# Patient Record
Sex: MALE | Race: WHITE | NOT HISPANIC OR LATINO | Employment: FULL TIME | URBAN - METROPOLITAN AREA
[De-identification: names, ages, dates, MRNs, and addresses within clinical notes are randomized per-mention and may not be internally consistent; named-entity substitution may affect disease eponyms.]

---

## 2020-02-25 ENCOUNTER — HOSPITAL ENCOUNTER (EMERGENCY)
Facility: HOSPITAL | Age: 35
Discharge: HOME | End: 2020-02-25
Attending: FAMILY MEDICINE

## 2020-02-25 VITALS
DIASTOLIC BLOOD PRESSURE: 84 MMHG | BODY MASS INDEX: 27.25 KG/M2 | OXYGEN SATURATION: 99 % | WEIGHT: 184 LBS | SYSTOLIC BLOOD PRESSURE: 133 MMHG | TEMPERATURE: 98 F | HEIGHT: 69 IN | RESPIRATION RATE: 16 BRPM | HEART RATE: 99 BPM

## 2020-02-25 DIAGNOSIS — S97.82XA CRUSH INJURY OF LEFT FOOT, INITIAL ENCOUNTER: ICD-10-CM

## 2020-02-25 PROCEDURE — 99283 EMERGENCY DEPT VISIT LOW MDM: CPT | Mod: 25

## 2020-02-25 RX ORDER — NAPROXEN 500 MG/1
500 TABLET ORAL 2 TIMES DAILY WITH MEALS
Qty: 12 TABLET | Refills: 0 | Status: SHIPPED | OUTPATIENT
Start: 2020-02-25

## 2020-02-25 NOTE — ED NOTES
Patient identifiers verified and correct for Jonah Denise Dar.    LOC: The patient is awake, alert and aware of environment with an appropriate affect, the patient is oriented x 3 and speaking appropriately.  APPEARANCE: Patient resting comfortably and in no acute distress, patient is clean and well groomed, patient's clothing is properly fastened.  SKIN: The skin is warm and dry, color consistent with ethnicity, patient has normal skin turgor and moist mucus membranes, skin intact, no breakdown or bruising noted.  MUSCULOSKELETAL: Patient moving all extremities spontaneously. EX  L foot pain. No swelling or deformity noted.   RESPIRATORY: Airway is open and patent, respirations are spontaneous.  CARDIAC: Patient has a normal rate, no periphreal edema noted, capillary refill < 3 seconds.  ABDOMEN: Soft and non tender to palpation.

## 2020-02-25 NOTE — ED PROVIDER NOTES
History      Chief Complaint   Patient presents with    Foot Pain     pt reports left foot pain, pt was involved in work injury several weeks ago where a machine rolled over his left foot       Review of patient's allergies indicates:  No Known Allergies     HPI   HPI    2/25/2020, 1:11 PM   History obtained from the patient      History of Present Illness: Jonah Dodge is a 34 y.o. male patient who presents to the Emergency Department for continued soreness to left foot since a machine rolled over it a few weeks ago.  He had normal xray at [facility] ER immediately after but was warned of possible occult fracture.  Able to weight bear with limp. Symptoms are moderate in severity.     No further complaints or concerns at this time.           PCP: No primary care provider on file.       Past Medical History:  No past medical history on file.      Past Surgical History:  No past surgical history on file.        Family History:  No family history on file.        Social History:  Social History     Tobacco Use    Smoking status: Not on file   Substance and Sexual Activity    Alcohol use: Not on file    Drug use: Not on file    Sexual activity: Not on file       ROS     Review of Systems   Constitutional: Negative for chills and fever.   HENT: Negative for facial swelling and trouble swallowing.    Eyes: Negative for pain and discharge.   Respiratory: Negative for chest tightness and shortness of breath.    Cardiovascular: Negative for palpitations and leg swelling.   Gastrointestinal: Negative for diarrhea and vomiting.   Endocrine: Negative for polydipsia and polyuria.   Genitourinary: Negative for decreased urine volume and flank pain.   Musculoskeletal: Negative for joint swelling and neck stiffness.   Skin: Negative for rash and wound.   Neurological: Negative for syncope and light-headedness.   All other systems reviewed and are negative.      Physical Exam      Initial Vitals [02/25/20 1204]   BP  "Pulse Resp Temp SpO2   133/84 99 16 98.2 °F (36.8 °C) 99 %      MAP       --         Physical Exam  Vital signs and nursing notes reviewed.  Constitutional: Patient is in NAD. Awake and alert. Well-developed and well-nourished.  Head: Atraumatic. Normocephalic.  Eyes: PERRL. EOM intact. Conjunctivae nl. No scleral icterus.  ENT: Mucous membranes are moist. Oropharynx is clear.  Neck: Supple. No JVD. No lymphadenopathy.  No meningismus  Cardiovascular: Regular rate and rhythm. No murmurs, rubs, or gallops. Distal pulses are 2+ and symmetric.  Pulmonary/Chest: No respiratory distress. Clear to auscultation bilaterally. No wheezing, rales, or rhonchi.  Abdominal: Soft. Non-distended. No TTP. No rebound, guarding, or rigidity. Good bowel sounds.  Genitourinary: No CVA tenderness  Musculoskeletal: Moves all extremities. No edema.  Left foot with ttp over mtatarsals 4,5.  Healing abrasion to area.  No signs of infection.  Decreased sensation to 5th toe.  Cap refill less than 2, to toes x 5.  Skin: Warm and dry.  Neurological: Awake and alert. No acute focal neurological deficits are appreciated.  Psychiatric: Normal affect. Good eye contact. Appropriate in content.      ED Course          Procedures  ED Vital Signs:  Vitals:    02/25/20 1204   BP: 133/84   Pulse: 99   Resp: 16   Temp: 98.2 °F (36.8 °C)   TempSrc: Oral   SpO2: 99%   Weight: 83.4 kg (183 lb 15.6 oz)   Height: 5' 9" (1.753 m)                 Imaging Results:  Imaging Results          X-Ray Foot Complete Left (Final result)  Result time 02/25/20 12:40:41    Final result by Wyatt Trujillo Jr., MD (02/25/20 12:40:41)                 Impression:      No acute findings.  Mild degenerative change of the talonavicular joint.      Electronically signed by: Wyatt Trujillo Jr., MD  Date:    02/25/2020  Time:    12:40             Narrative:    EXAMINATION:  XR FOOT COMPLETE 3 VIEW LEFT    CLINICAL HISTORY:  . Crush injury.    COMPARISON:  None    FINDINGS:  No acute " fracture or dislocation.  Mild degenerative change of the talonavicular articulation with tiny osteophytes.   Soft tissues are normal.                                   The Emergency Provider reviewed the vital signs and test results, which are outlined above.    ED Discussion             Medication(s) given in the ER:  Medications - No data to display        Follow-up Information     [facility] [place] In 2 days.    Contact information:  [address]  [place] [address] [phone]                          Medication List      START taking these medications    naproxen 500 MG tablet  Commonly known as:  NAPROSYN  Take 1 tablet (500 mg total) by mouth 2 (two) times daily with meals. Prn pain           Where to Get Your Medications      You can get these medications from any pharmacy    Bring a paper prescription for each of these medications  · naproxen 500 MG tablet             Medical Decision Making        All findings were reviewed with the patient/family in detail.   All remaining questions and concerns were addressed at that time.  Patient/family has been counseled regarding the need for follow-up as well as the indication to return to the emergency room should new or worrisome developments occur.        MDM               Clinical Impression:        ICD-10-CM ICD-9-CM   1. Crush injury of left foot, initial encounter S97.82XA 928.20             Venus Guzman PA-C  02/25/20 1317

## 2021-09-13 ENCOUNTER — HOSPITAL ENCOUNTER (EMERGENCY)
Facility: HOSPITAL | Age: 36
Discharge: HOME | End: 2021-09-13
Attending: EMERGENCY MEDICINE

## 2021-09-13 VITALS
BODY MASS INDEX: 24.54 KG/M2 | OXYGEN SATURATION: 98 % | WEIGHT: 171.44 LBS | HEIGHT: 70 IN | TEMPERATURE: 98 F | DIASTOLIC BLOOD PRESSURE: 78 MMHG | HEART RATE: 88 BPM | SYSTOLIC BLOOD PRESSURE: 129 MMHG | RESPIRATION RATE: 16 BRPM

## 2021-09-13 DIAGNOSIS — Z20.2 TRICHOMONAS EXPOSURE: Primary | ICD-10-CM

## 2021-09-13 LAB
BACTERIA #/AREA URNS HPF: ABNORMAL /HPF
BILIRUB UR QL STRIP: NEGATIVE
CLARITY UR: CLEAR
COLOR UR: YELLOW
GLUCOSE UR QL STRIP: NEGATIVE
HGB UR QL STRIP: ABNORMAL
HYALINE CASTS #/AREA URNS LPF: 0 /LPF
KETONES UR QL STRIP: NEGATIVE
LEUKOCYTE ESTERASE UR QL STRIP: NEGATIVE
MICROSCOPIC COMMENT: ABNORMAL
NITRITE UR QL STRIP: NEGATIVE
PH UR STRIP: 8 [PH] (ref 5–8)
PROT UR QL STRIP: ABNORMAL
RBC #/AREA URNS HPF: 5 /HPF (ref 0–4)
SP GR UR STRIP: 1.02 (ref 1–1.03)
URN SPEC COLLECT METH UR: ABNORMAL
UROBILINOGEN UR STRIP-ACNC: NEGATIVE EU/DL
WBC #/AREA URNS HPF: 1 /HPF (ref 0–5)
YEAST URNS QL MICRO: ABNORMAL

## 2021-09-13 PROCEDURE — 81000 URINALYSIS NONAUTO W/SCOPE: CPT | Performed by: EMERGENCY MEDICINE

## 2021-09-13 PROCEDURE — 87591 N.GONORRHOEAE DNA AMP PROB: CPT | Performed by: EMERGENCY MEDICINE

## 2021-09-13 PROCEDURE — 99283 EMERGENCY DEPT VISIT LOW MDM: CPT

## 2021-09-13 PROCEDURE — 87491 CHLMYD TRACH DNA AMP PROBE: CPT | Performed by: EMERGENCY MEDICINE

## 2021-09-13 RX ORDER — METRONIDAZOLE 500 MG/1
500 TABLET ORAL EVERY 12 HOURS
Qty: 14 TABLET | Refills: 0 | Status: SHIPPED | OUTPATIENT
Start: 2021-09-13 | End: 2021-09-20

## 2021-09-14 LAB
C TRACH DNA SPEC QL NAA+PROBE: NOT DETECTED
N GONORRHOEA DNA SPEC QL NAA+PROBE: NOT DETECTED